# Patient Record
Sex: FEMALE | Employment: UNEMPLOYED | URBAN - METROPOLITAN AREA
[De-identification: names, ages, dates, MRNs, and addresses within clinical notes are randomized per-mention and may not be internally consistent; named-entity substitution may affect disease eponyms.]

---

## 2020-11-18 LAB
AVERAGE GLUCOSE: 88
HBA1C MFR BLD: 4.7 %

## 2022-02-16 LAB
C. TRACHOMATIS, EXTERNAL RESULT: NEGATIVE
GBS, EXTERNAL RESULT: NORMAL
N. GONORRHOEAE, EXTERNAL RESULT: NEGATIVE

## 2022-02-23 LAB
ABO, EXTERNAL RESULT: NORMAL
HEP B, EXTERNAL RESULT: NON REACTIVE
HEPATITIS C ANTIBODY, EXTERNAL RESULT: NON REACTIVE
HIV, EXTERNAL RESULT: NON REACTIVE
RH FACTOR, EXTERNAL RESULT: POSITIVE
RPR, EXTERNAL RESULT: NON REACTIVE
RUBELLA TITER, EXTERNAL RESULT: NORMAL

## 2022-06-28 ENCOUNTER — NURSE ONLY (OUTPATIENT)
Dept: OBGYN CLINIC | Age: 47
End: 2022-06-28

## 2022-06-28 VITALS — WEIGHT: 134 LBS | HEIGHT: 66 IN | BODY MASS INDEX: 21.53 KG/M2

## 2022-06-28 DIAGNOSIS — O09.43 GRAND MULTIPARITY WITH CURRENT PREGNANCY IN THIRD TRIMESTER: Primary | ICD-10-CM

## 2022-06-28 NOTE — PROGRESS NOTES
NOB consult with pt. Labs today: none. Offered pt the option of CF, SMA, and Fragile X carrier testing. Pt declines testing. Pt had negative x4 NIPT. Instructed pt on exercise/nutrition in pregnancy. Reviewed [facility] preg book. Advised pt on using [facility] for hospital needs and [facility] L&D for pregnancy related emergencies. Pt states understanding. NOB forms signed, scanned, and given to pt. Medical Hx: grandmultip, h/o GBS, h/o precip labor (3 hours)    Surgical Hx: 2006 R breast lumpectomy, 1985-appy    Last Pap: 2021-wnl per pt    Pt OB c/o: none. Pt requests to do Hgb A1C in place of glucose as this is what she has done for last several pregnancies. Will d/w  tomorrow. Also requests enema on admission to hospital d/t h/o PP constipation. D/t h/o short labors (last several deliveries were 3-4 hours) pt requests IOL at 39 weeks. Will d/w  tomorrow.

## 2022-06-29 ENCOUNTER — ROUTINE PRENATAL (OUTPATIENT)
Dept: OBGYN CLINIC | Age: 47
End: 2022-06-29
Payer: MEDICAID

## 2022-06-29 VITALS — SYSTOLIC BLOOD PRESSURE: 116 MMHG | WEIGHT: 135 LBS | DIASTOLIC BLOOD PRESSURE: 72 MMHG | BODY MASS INDEX: 22.12 KG/M2

## 2022-06-29 DIAGNOSIS — O09.523 HIGH RISK PREGNANCY, MULTIGRAVIDA OF ADVANCED MATERNAL AGE IN THIRD TRIMESTER: ICD-10-CM

## 2022-06-29 DIAGNOSIS — Z64.1 GRAND MULTIPARA: ICD-10-CM

## 2022-06-29 PROCEDURE — 99204 OFFICE O/P NEW MOD 45 MIN: CPT | Performed by: OBSTETRICS & GYNECOLOGY

## 2022-06-29 NOTE — PROGRESS NOTES
Aggie Estes  presents for Abdiel Quach 9038. [id number]. 30w0d. PL and MFM notes reviewed as applicable.   Taking Prenatal Vitamins: Yes  She is noticing:  no unusual complaints    Records reviewed   High risk pregnancy, multigravida of advanced maternal age in third trimester   history and recommendations reviewed    [address] multipara   noted

## 2022-06-29 NOTE — PATIENT INSTRUCTIONS
Learning About Pregnancy  Your Care Instructions     Your health in the early weeks of your pregnancy is particularly important for your baby's health. Take good care of yourself. Anything you do that harms your body can also harm your baby. Make sure to go to all of your doctor appointments. Regular checkups will help keep you and your baby healthy. How can you care for yourself at home? Diet    · Eat a balanced diet. Make sure your diet includes plenty of beans, peas, and leafy green vegetables. · Do not skip meals or go for many hours without eating. If you are nauseated, try to eat a small, healthy snack every 2 to 3 hours. · Do not eat fish that has a high level of mercury, such as shark, swordfish, or mackerel. Do not eat more than one can of tuna each week. · Drink plenty of fluids. If you have kidney, heart, or liver disease and have to limit fluids, talk with your doctor before you increase the amount of fluids you drink. · Cut down on caffeine, such as coffee, tea, and cola. · Do not drink alcohol, such as beer, wine, or hard liquor. · Take a multivitamin that contains at least 400 micrograms (mcg) of folic acid to help prevent birth defects. Fortified cereal and whole wheat bread are good additional sources of folic acid. · Increase the calcium in your diet. Try to drink a quart of skim milk each day. You may also take calcium supplements and choose foods such as cheese and yogurt. Lifestyle    · Make sure you go to your follow-up appointments. · Get plenty of rest. You may be unusually tired while you are pregnant. · Get at least 30 minutes of exercise on most days of the week. Walking is a good choice. If you have not exercised in the past, start out slowly. Take several short walks each day. · Do not smoke. If you need help quitting, talk to your doctor about stop-smoking programs. These can increase your chances of quitting for good.      · Do not touch cat feces or litter boxes. Also, wash your hands after you handle raw meat, and fully cook all meat before you eat it. Wear gloves when you work in the yard or garden, and wash your hands well when you are done. Cat feces, raw or undercooked meat, and contaminated dirt can cause an infection that may harm your baby or lead to a miscarriage. · Do not use saunas or hot tubs. Raising your body temperature may harm your baby. · Avoid chemical fumes, paint fumes, or poisons. · Do not use illegal drugs, marijuana, or alcohol. Medicines    · Review all of your medicines with your doctor. Some of your routine medicines may need to be changed to protect your baby. · Use acetaminophen (Tylenol) to relieve minor problems, such as a mild headache or backache or a mild fever with cold symptoms. Do not use nonsteroidal anti-inflammatory drugs (NSAIDs), such as ibuprofen (Advil, Motrin) or naproxen (Aleve), unless your doctor says it is okay. · Do not take two or more pain medicines at the same time unless the doctor told you to. Many pain medicines have acetaminophen, (tylenolO which is safe in pregnancy. However, Too much acetaminophen (Tylenol) can be harmful. · Take your medicines exactly as prescribed. Call your doctor if you think you are having a problem with your medicine. To manage morning sickness    · If you feel sick when you first wake up, try eating a small snack (such as crackers) before you get out of bed. Allow some time to digest the snack, and then get out of bed slowly. · Do not skip meals or go for long periods without eating. An empty stomach can make nausea worse. · Eat small, frequent meals instead of three large meals each day. · Drink plenty of fluids. · Eat foods that are high in protein but low in fat and carbohydrates (sugars). · If you are taking iron supplements, ask your doctor if they are necessary. Iron can make nausea worse.      · Avoid any smells,  that make you feel sick. · Get lots of rest. Morning sickness may be worse when you are tired. Follow-up care is a key part of your treatment and safety. Be sure to make and go to all appointments, and call us if you are having problems. It's also a good idea to know your test results and keep a list of the medicines you take. Nutrition During Pregnancy: Care Instructions  Overview     Healthy eating when you are pregnant is important for you and your baby. It can help you feel well and have a successful pregnancy and delivery. During pregnancy your nutrition needs increase. Even if you have excellent eating habits, your doctor may recommend a multivitamin to make sure you get enough iron and folic acid. You may wonder how much weight you should gain. In general, if you were at a healthy weight before you became pregnant, then you should gain between 25 and 35 pounds. If you were overweight before pregnancy, then you'll likely be advised to gain 15 to 25 pounds. If you were underweight before pregnancy, then you'll probably be advised to gain 28 to 40 pounds. Your doctor will work with you to set a weight goal that is right for you. Gaining a healthy amount of weight helps you have a healthy baby. Follow-up care is a key part of your treatment and safety. Be sure to make and go to all appointments, and call your doctor if you are having problems. It's also a good idea to know your test results and keep a list of the medicines you take. How can you care for yourself at home? · Eat plenty of fruits and vegetables. Include a variety of orange, yellow, and leafy dark-green vegetables every day. · Choose whole-grain bread, cereal, and pasta. Good choices include whole wheat bread, whole wheat pasta, brown rice, and oatmeal.  · Get 4 or more servings of milk and milk products each day. Good choices include nonfat or low-fat milk, yogurt, and cheese.  If you cannot eat milk products, you can get calcium from calcium-fortified products such as orange juice, soy milk, and tofu. Other non-milk sources of calcium include leafy green vegetables, such as broccoli, kale, mustard greens, turnip greens, bok laura, and brussels sprouts. · If you eat meat, pick lower-fat types. Good choices include lean cuts of meat and chicken or turkey without the skin. · Do not eat shark, swordfish, anna mackerel, or tilefish. They have high levels of mercury, which is dangerous to your baby. You can eat up to 12 ounces a week of fish or shellfish that have low mercury levels. Good choices include shrimp, wild salmon, pollock, and catfish. Limit some other types of fish, such as white (albacore) tuna, to 4 oz (0.1 kg) a week. · Heat lunch meats (such as turkey, ham, or bologna) to 165°F before you eat them. This reduces your risk of getting sick from a kind of bacteria that can be found in lunch meats. · Do not eat unpasteurized soft cheeses, such as brie, feta, fresh mozzarella, and blue cheese. They have a bacteria that could harm your baby. · Limit caffeine. If you drink coffee or tea, have no more than 1 cup a day. Caffeine is also found in april. · Do not drink any alcohol. No amount of alcohol has been found to be safe during pregnancy. · Do not diet or try to lose weight. For example, do not follow a low-carbohydrate diet. If you are overweight at the start of your pregnancy, your doctor will work with you to manage your weight gain. · Tell your doctor about all vitamins and supplements you take. When should you call for help? Watch closely for changes in your health, and be sure to contact your doctor if you have any problems. Where can you learn more? Where can you learn more? Go to http://www.gray.com/  Enter G118 in the search box to learn more about \"Learning About Pregnancy. \"  Go to http://www.gray.com/  Enter Y785 in the search box to learn more about \"Nutrition During Pregnancy: Care Instructions. \"  Current as of: December 17, 2020               Content Version: 13.0  © 6555-3777 [employer]wise, [employer]orporated. Care instructions adapted under license by [employer] (which disclaims liability or warranty for this information). If you have questions about a medical condition or this instruction, always ask your healthcare professional. [address] any warranty or liability for your use of this information.

## 2022-07-12 ENCOUNTER — CLINICAL DOCUMENTATION (OUTPATIENT)
Dept: OBGYN CLINIC | Age: 47
End: 2022-07-12

## 2022-07-12 ENCOUNTER — ROUTINE PRENATAL (OUTPATIENT)
Dept: OBGYN CLINIC | Age: 47
End: 2022-07-12
Payer: MEDICAID

## 2022-07-12 VITALS — BODY MASS INDEX: 22.12 KG/M2 | SYSTOLIC BLOOD PRESSURE: 102 MMHG | WEIGHT: 135 LBS | DIASTOLIC BLOOD PRESSURE: 60 MMHG

## 2022-07-12 DIAGNOSIS — R82.71 GBS BACTERIURIA: ICD-10-CM

## 2022-07-12 DIAGNOSIS — O09.523 AMA (ADVANCED MATERNAL AGE) MULTIGRAVIDA 35+, THIRD TRIMESTER: Primary | ICD-10-CM

## 2022-07-12 DIAGNOSIS — O09.523 HIGH RISK PREGNANCY, MULTIGRAVIDA OF ADVANCED MATERNAL AGE IN THIRD TRIMESTER: ICD-10-CM

## 2022-07-12 DIAGNOSIS — Z64.1 GRAND MULTIPARA: ICD-10-CM

## 2022-07-12 DIAGNOSIS — Z13.0 SCREENING, ANEMIA, DEFICIENCY, IRON: ICD-10-CM

## 2022-07-12 PROBLEM — O09.30 HISTORY OF INADEQUATE PRENATAL CARE: Status: ACTIVE | Noted: 2022-07-12

## 2022-07-12 PROBLEM — Z86.79 H/O VARICOSE VEINS: Status: ACTIVE | Noted: 2022-07-12

## 2022-07-12 PROCEDURE — 99417 PROLNG OP E/M EACH 15 MIN: CPT | Performed by: OBSTETRICS & GYNECOLOGY

## 2022-07-12 PROCEDURE — 99215 OFFICE O/P EST HI 40 MIN: CPT | Performed by: OBSTETRICS & GYNECOLOGY

## 2022-07-12 NOTE — PROGRESS NOTES
Several attempts have been made to contact previous OB for JULIET scan report. Only records they are sending are what we already have. Chris Ac has also called the office at [phone] and LM. Will attempt to contact again tomorrow.

## 2022-07-12 NOTE — PROGRESS NOTES
MARICRUZ [id number] 31w6d by d=12wk US  Denies LOF, VB, Ctxs. Good FM. Late Transfer pt from [facility] [address] --all records reviewed by me today   Rotating     \"Pt requests to do Hgb A1C in place of glucose as this is what she has done for last several pregnancies\" --reports inability to tolerate glucola    hgb A1C 5.0 (7/5/22)   No recent CBC -- will get today __   Tdap counseling -- DECLINES     Rh  Pos       AMA (47yo), Grandmultip:   NIPT (mat 21) low risk Female  Not taking ASA      Had not been seen since 20wks prior to seeing Dr Kelsey Olivares last encounter at [address] today __     In [address] [facility] it appears she was seen by MFM at [facility] on 4/6/22 before transfer but unfortunately I cannot see any of the note, nor the details of her [address]. We will contact them to get release of information  ASAP ___   Pt verbally reports that her US was wnl      Pt apparently unaware that she was seen by MFM for that US as she DECLINES to see MFM here b/c \"Im so low risk\" and \"I would know if there was something wrong\"               S<<D on  today     Largest baby 3640g (8#0.4oz)  Growth US w/ repeat JULIET US at [address] in 1wk ___     Will get serial growths  Weekly bpps around 36wks unless indicated earlier   Recommend IOL at 39wks           GBS bacteriuria:  See media tab from OB prior to transfer to us (+at preg intake)  Pt states was not aware   Needs treatment in labor --no need to repeat gbs swab at 36wks  Repeat urine cx today ___       Pregravid BMI 19             Patient counseled face to face for more than 50% of the total time spent with the patient. On this date I have spent 79 minutes extensively reviewing previous notes, test results, calling prior OBGYN office personally for more info, discussing pt w/ MFM in our system, and face to face with the patient discussing the diagnosis and importance of compliance with the treatment plan as well as documenting.

## 2022-07-13 ENCOUNTER — CLINICAL DOCUMENTATION (OUTPATIENT)
Dept: OBGYN CLINIC | Age: 47
End: 2022-07-13

## 2022-07-13 LAB
ERYTHROCYTE [DISTWIDTH] IN BLOOD BY AUTOMATED COUNT: 13.4 % (ref 11.9–14.6)
HCT VFR BLD AUTO: 35.1 % (ref 35.8–46.3)
HGB BLD-MCNC: 11.4 G/DL (ref 11.7–15.4)
MCH RBC QN AUTO: 27.1 PG (ref 26.1–32.9)
MCHC RBC AUTO-ENTMCNC: 32.5 G/DL (ref 31.4–35)
MCV RBC AUTO: 83.6 FL (ref 79.6–97.8)
NRBC # BLD: 0 K/UL (ref 0–0.2)
PLATELET # BLD AUTO: 200 K/UL (ref 150–450)
PMV BLD AUTO: 10.8 FL (ref 9.4–12.3)
RBC # BLD AUTO: 4.2 M/UL (ref 4.05–5.2)
WBC # BLD AUTO: 10.7 K/UL (ref 4.3–11.1)

## 2022-07-13 NOTE — PROGRESS NOTES
Call to [facility] office [phone]. S/W patrice who will contact MR and have JULIET scan faxed to us.

## 2022-07-15 LAB
BACTERIA SPEC CULT: ABNORMAL
BACTERIA SPEC CULT: ABNORMAL
SERVICE CMNT-IMP: ABNORMAL

## 2022-07-24 NOTE — ASSESSMENT & PLAN NOTE
S<<D on FH today     Largest baby 3640g (8#0.4oz)  Growth US w/ repeat JULIET US at [address] in 1wk ___pt either cx'd or NS'd this appt with me. When RS'd, growth scan wasn't added.       Will get serial growths  Weekly bpps around 36wks unless indicated earlier   Recommend IOL at 39wks

## 2022-07-25 ENCOUNTER — ROUTINE PRENATAL (OUTPATIENT)
Dept: OBGYN CLINIC | Age: 47
End: 2022-07-25
Payer: MEDICAID

## 2022-07-25 VITALS
HEIGHT: 66 IN | BODY MASS INDEX: 22.18 KG/M2 | WEIGHT: 138 LBS | DIASTOLIC BLOOD PRESSURE: 66 MMHG | SYSTOLIC BLOOD PRESSURE: 100 MMHG

## 2022-07-25 DIAGNOSIS — O09.30 HISTORY OF INADEQUATE PRENATAL CARE: ICD-10-CM

## 2022-07-25 DIAGNOSIS — Z91.199 NONCOMPLIANCE: ICD-10-CM

## 2022-07-25 DIAGNOSIS — Z64.1 GRAND MULTIPARA: ICD-10-CM

## 2022-07-25 DIAGNOSIS — O09.523 HIGH RISK PREGNANCY, MULTIGRAVIDA OF ADVANCED MATERNAL AGE IN THIRD TRIMESTER: Primary | ICD-10-CM

## 2022-07-25 DIAGNOSIS — Z3A.33 33 WEEKS GESTATION OF PREGNANCY: ICD-10-CM

## 2022-07-25 PROCEDURE — 99213 OFFICE O/P EST LOW 20 MIN: CPT | Performed by: OBSTETRICS & GYNECOLOGY

## 2022-07-25 NOTE — PROGRESS NOTES
Jovani Menard  presents for Abdiel Quach 9038. [id number]. 33w5d. PL and MFM notes reviewed as applicable. Taking Prenatal Vitamins: Yes  She is noticing:  no unusual complaints  As some impt info is always in the [facility] flowsheet, please also refer to that. History of inadequate prenatal care  UDS wasn't done on 7-12. Sent today. High risk pregnancy, multigravida of advanced maternal age in third trimester  S<<D on FH today     Largest baby 65g (8#0.4oz)  Growth US w/ repeat JULIET US at [address] in 1wk ___pt either cx'd or NS'd this appt with me. When RS'd, growth scan wasn't added. Will get serial growths  Weekly bpps around 36wks unless indicated earlier   Recommend IOL at 39wks     Pt again today declines growth scan and also declines any future fetal testing. I explained to pt that she is 40, and is at increased risk for fetal growth issues, placental dysfxn, stillbirth. She says she feels like everything is going fine and she is going to skip all that.

## 2022-07-26 LAB
AMPHET UR QL SCN: NEGATIVE
BARBITURATES UR QL SCN: NEGATIVE
BENZODIAZ UR QL: NEGATIVE
CANNABINOIDS UR QL SCN: NEGATIVE
COCAINE UR QL SCN: NEGATIVE
METHADONE UR QL: NEGATIVE
OPIATES UR QL: NEGATIVE
PCP UR QL: NEGATIVE

## 2022-08-11 ENCOUNTER — ROUTINE PRENATAL (OUTPATIENT)
Dept: OBGYN CLINIC | Age: 47
End: 2022-08-11
Payer: MEDICAID

## 2022-08-11 VITALS — BODY MASS INDEX: 22.29 KG/M2 | SYSTOLIC BLOOD PRESSURE: 102 MMHG | DIASTOLIC BLOOD PRESSURE: 70 MMHG | WEIGHT: 136 LBS

## 2022-08-11 DIAGNOSIS — Z64.1 GRAND MULTIPARA: ICD-10-CM

## 2022-08-11 DIAGNOSIS — Z91.199 NONCOMPLIANCE: ICD-10-CM

## 2022-08-11 DIAGNOSIS — O09.523 HIGH RISK PREGNANCY, MULTIGRAVIDA OF ADVANCED MATERNAL AGE IN THIRD TRIMESTER: Primary | ICD-10-CM

## 2022-08-11 PROCEDURE — 99213 OFFICE O/P EST LOW 20 MIN: CPT | Performed by: OBSTETRICS & GYNECOLOGY

## 2022-08-16 ENCOUNTER — ROUTINE PRENATAL (OUTPATIENT)
Dept: OBGYN CLINIC | Age: 47
End: 2022-08-16
Payer: MEDICAID

## 2022-08-16 ENCOUNTER — CLINICAL DOCUMENTATION (OUTPATIENT)
Dept: OBGYN CLINIC | Age: 47
End: 2022-08-16

## 2022-08-16 VITALS — SYSTOLIC BLOOD PRESSURE: 104 MMHG | BODY MASS INDEX: 22.62 KG/M2 | WEIGHT: 138 LBS | DIASTOLIC BLOOD PRESSURE: 64 MMHG

## 2022-08-16 DIAGNOSIS — O09.523 HIGH RISK PREGNANCY, MULTIGRAVIDA OF ADVANCED MATERNAL AGE IN THIRD TRIMESTER: Primary | ICD-10-CM

## 2022-08-16 DIAGNOSIS — Z64.1 GRAND MULTIPARA: ICD-10-CM

## 2022-08-16 DIAGNOSIS — Z91.199 NONCOMPLIANCE: ICD-10-CM

## 2022-08-16 PROCEDURE — 99213 OFFICE O/P EST LOW 20 MIN: CPT | Performed by: OBSTETRICS & GYNECOLOGY

## 2022-08-16 NOTE — ASSESSMENT & PLAN NOTE
Kick counts and labor precautions reviewed   Declines/reufses recommended  testing or US. Has been extensively counseled previously and again today by myself.    3 cm last week with CBB  IOL requested for  at 39 weeks  We reviewed increased risk of PPH due to grand mul parity

## 2022-08-16 NOTE — PROGRESS NOTES
S/LILIAN Gonzalez at [facility] L&D to schedule pt's IOL with  on 8/31/22. Tynker message sent to pt.

## 2022-08-26 ENCOUNTER — TELEPHONE (OUTPATIENT)
Dept: OBGYN CLINIC | Age: 47
End: 2022-08-26

## 2022-08-26 NOTE — TELEPHONE ENCOUNTER
Message received from pt requesting an appt for Monday. Pt is scheduled for IOL 8/31/22. Last seen 8/16/22. Call to pt to schedule. No answer. Scheduled for 8/29/22 9:300 with . LM with appt info.

## 2022-08-28 ENCOUNTER — HOSPITAL ENCOUNTER (OUTPATIENT)
Age: 47
Discharge: HOME | End: 2022-08-28
Attending: OBSTETRICS & GYNECOLOGY | Admitting: OBSTETRICS & GYNECOLOGY
Payer: MEDICAID

## 2022-08-28 VITALS
TEMPERATURE: 98.1 F | DIASTOLIC BLOOD PRESSURE: 60 MMHG | RESPIRATION RATE: 16 BRPM | OXYGEN SATURATION: 98 % | WEIGHT: 140 LBS | BODY MASS INDEX: 22.5 KG/M2 | HEIGHT: 66 IN | HEART RATE: 74 BPM | SYSTOLIC BLOOD PRESSURE: 100 MMHG

## 2022-08-28 PROBLEM — O47.1 FALSE LABOR AT OR AFTER 37 COMPLETED WEEKS OF GESTATION: Status: ACTIVE | Noted: 2022-08-28

## 2022-08-28 PROCEDURE — 99282 EMERGENCY DEPT VISIT SF MDM: CPT

## 2022-08-28 NOTE — PROGRESS NOTES
Discharge paperwork gone over w/ pt. Pt verbalized understanding and has no questions at this time. NST reactive. TOCO and EFM removed from abd. Pt ambulated off of floor w/o difficulty.

## 2022-08-28 NOTE — PROGRESS NOTES
Pt reports to [facility] w/ complaints of ctx q15 mins that were painful. Pt stated ctx were a 3 out of 10 in pain. Pt stated that ctx have started to become further apart since arrival. TOCO and EFM applied to soft, non-tender abd. Pt has no complaints of any LOF, vaginal bleeding, blurry vision, or H/A. SVE performed, 3/40/-3. MD made aware.

## 2022-08-30 RX ORDER — TERBUTALINE SULFATE 1 MG/ML
0.25 INJECTION, SOLUTION SUBCUTANEOUS ONCE
Status: CANCELLED | OUTPATIENT
Start: 2022-08-30 | End: 2022-08-30

## 2022-08-30 RX ORDER — SODIUM CHLORIDE 0.9 % (FLUSH) 0.9 %
5-40 SYRINGE (ML) INJECTION EVERY 12 HOURS SCHEDULED
Status: CANCELLED | OUTPATIENT
Start: 2022-08-30

## 2022-08-30 RX ORDER — SODIUM CHLORIDE 9 MG/ML
25 INJECTION, SOLUTION INTRAVENOUS PRN
Status: CANCELLED | OUTPATIENT
Start: 2022-08-30

## 2022-08-30 RX ORDER — SODIUM CHLORIDE, SODIUM LACTATE, POTASSIUM CHLORIDE, AND CALCIUM CHLORIDE .6; .31; .03; .02 G/100ML; G/100ML; G/100ML; G/100ML
1000 INJECTION, SOLUTION INTRAVENOUS PRN
Status: CANCELLED | OUTPATIENT
Start: 2022-08-30

## 2022-08-30 RX ORDER — SODIUM CHLORIDE, SODIUM LACTATE, POTASSIUM CHLORIDE, CALCIUM CHLORIDE 600; 310; 30; 20 MG/100ML; MG/100ML; MG/100ML; MG/100ML
INJECTION, SOLUTION INTRAVENOUS CONTINUOUS
Status: CANCELLED | OUTPATIENT
Start: 2022-08-30

## 2022-08-30 RX ORDER — SODIUM CHLORIDE, SODIUM LACTATE, POTASSIUM CHLORIDE, AND CALCIUM CHLORIDE .6; .31; .03; .02 G/100ML; G/100ML; G/100ML; G/100ML
500 INJECTION, SOLUTION INTRAVENOUS PRN
Status: CANCELLED | OUTPATIENT
Start: 2022-08-30

## 2022-08-30 RX ORDER — TRANEXAMIC ACID 10 MG/ML
1000 INJECTION, SOLUTION INTRAVENOUS
Status: CANCELLED | OUTPATIENT
Start: 2022-08-30 | End: 2022-08-30

## 2022-08-30 RX ORDER — MISOPROSTOL 200 UG/1
800 TABLET ORAL PRN
Status: CANCELLED | OUTPATIENT
Start: 2022-08-30

## 2022-08-30 RX ORDER — ONDANSETRON 2 MG/ML
4 INJECTION INTRAMUSCULAR; INTRAVENOUS EVERY 6 HOURS PRN
Status: CANCELLED | OUTPATIENT
Start: 2022-08-30

## 2022-08-30 RX ORDER — CARBOPROST TROMETHAMINE 250 UG/ML
250 INJECTION, SOLUTION INTRAMUSCULAR PRN
Status: CANCELLED | OUTPATIENT
Start: 2022-08-30

## 2022-08-30 RX ORDER — SODIUM CHLORIDE 0.9 % (FLUSH) 0.9 %
5-40 SYRINGE (ML) INJECTION PRN
Status: CANCELLED | OUTPATIENT
Start: 2022-08-30

## 2022-08-30 RX ORDER — METHYLERGONOVINE MALEATE 0.2 MG/ML
200 INJECTION INTRAVENOUS PRN
Status: CANCELLED | OUTPATIENT
Start: 2022-08-30

## 2022-08-31 ENCOUNTER — ANESTHESIA EVENT (OUTPATIENT)
Dept: LABOR AND DELIVERY | Age: 47
End: 2022-08-31
Payer: MEDICAID

## 2022-08-31 ENCOUNTER — HOSPITAL ENCOUNTER (INPATIENT)
Age: 47
LOS: 1 days | Discharge: HOME | End: 2022-09-01
Attending: OBSTETRICS & GYNECOLOGY | Admitting: OBSTETRICS & GYNECOLOGY
Payer: MEDICAID

## 2022-08-31 ENCOUNTER — ANESTHESIA (OUTPATIENT)
Dept: LABOR AND DELIVERY | Age: 47
End: 2022-08-31
Payer: MEDICAID

## 2022-08-31 PROBLEM — O09.523 MULTIGRAVIDA OF ADVANCED MATERNAL AGE IN THIRD TRIMESTER: Status: ACTIVE | Noted: 2022-08-31

## 2022-08-31 PROBLEM — O47.1 FALSE LABOR AT OR AFTER 37 COMPLETED WEEKS OF GESTATION: Status: RESOLVED | Noted: 2022-08-28 | Resolved: 2022-08-31

## 2022-08-31 PROBLEM — Z3A.39 39 WEEKS GESTATION OF PREGNANCY: Status: ACTIVE | Noted: 2022-08-31

## 2022-08-31 LAB
ABO + RH BLD: NORMAL
BASOPHILS # BLD: 0.1 K/UL (ref 0–0.2)
BASOPHILS NFR BLD: 1 % (ref 0–2)
BLOOD GROUP ANTIBODIES SERPL: NORMAL
DIFFERENTIAL METHOD BLD: NORMAL
EOSINOPHIL # BLD: 0.3 K/UL (ref 0–0.8)
EOSINOPHIL NFR BLD: 3 % (ref 0.5–7.8)
ERYTHROCYTE [DISTWIDTH] IN BLOOD BY AUTOMATED COUNT: 13.2 % (ref 11.9–14.6)
HCT VFR BLD AUTO: 36.2 % (ref 35.8–46.3)
HGB BLD-MCNC: 11.8 G/DL (ref 11.7–15.4)
IMM GRANULOCYTES # BLD AUTO: 0.1 K/UL (ref 0–0.5)
IMM GRANULOCYTES NFR BLD AUTO: 1 % (ref 0–5)
LYMPHOCYTES # BLD: 2.4 K/UL (ref 0.5–4.6)
LYMPHOCYTES NFR BLD: 23 % (ref 13–44)
MCH RBC QN AUTO: 26.9 PG (ref 26.1–32.9)
MCHC RBC AUTO-ENTMCNC: 32.6 G/DL (ref 31.4–35)
MCV RBC AUTO: 82.5 FL (ref 79.6–97.8)
MONOCYTES # BLD: 0.6 K/UL (ref 0.1–1.3)
MONOCYTES NFR BLD: 6 % (ref 4–12)
NEUTS SEG # BLD: 6.9 K/UL (ref 1.7–8.2)
NEUTS SEG NFR BLD: 67 % (ref 43–78)
NRBC # BLD: 0 K/UL (ref 0–0.2)
PLATELET # BLD AUTO: 200 K/UL (ref 150–450)
PMV BLD AUTO: 10.3 FL (ref 9.4–12.3)
RBC # BLD AUTO: 4.39 M/UL (ref 4.05–5.2)
SPECIMEN EXP DATE BLD: NORMAL
WBC # BLD AUTO: 10.3 K/UL (ref 4.3–11.1)

## 2022-08-31 PROCEDURE — 10907ZC DRAINAGE OF AMNIOTIC FLUID, THERAPEUTIC FROM PRODUCTS OF CONCEPTION, VIA NATURAL OR ARTIFICIAL OPENING: ICD-10-PCS | Performed by: OBSTETRICS & GYNECOLOGY

## 2022-08-31 PROCEDURE — 6370000000 HC RX 637 (ALT 250 FOR IP): Performed by: OBSTETRICS & GYNECOLOGY

## 2022-08-31 PROCEDURE — 7100000010 HC PHASE II RECOVERY - FIRST 15 MIN

## 2022-08-31 PROCEDURE — 86901 BLOOD TYPING SEROLOGIC RH(D): CPT

## 2022-08-31 PROCEDURE — 6360000002 HC RX W HCPCS: Performed by: NURSE ANESTHETIST, CERTIFIED REGISTERED

## 2022-08-31 PROCEDURE — 00HU33Z INSERTION OF INFUSION DEVICE INTO SPINAL CANAL, PERCUTANEOUS APPROACH: ICD-10-PCS | Performed by: ANESTHESIOLOGY

## 2022-08-31 PROCEDURE — 1100000000 HC RM PRIVATE

## 2022-08-31 PROCEDURE — 3E0R3BZ INTRODUCTION OF ANESTHETIC AGENT INTO SPINAL CANAL, PERCUTANEOUS APPROACH: ICD-10-PCS | Performed by: ANESTHESIOLOGY

## 2022-08-31 PROCEDURE — 7220000101 HC DELIVERY VAGINAL/SINGLE

## 2022-08-31 PROCEDURE — 2580000003 HC RX 258: Performed by: OBSTETRICS & GYNECOLOGY

## 2022-08-31 PROCEDURE — 6360000002 HC RX W HCPCS: Performed by: OBSTETRICS & GYNECOLOGY

## 2022-08-31 PROCEDURE — 59409 OBSTETRICAL CARE: CPT | Performed by: OBSTETRICS & GYNECOLOGY

## 2022-08-31 PROCEDURE — 7100000011 HC PHASE II RECOVERY - ADDTL 15 MIN

## 2022-08-31 PROCEDURE — 36415 COLL VENOUS BLD VENIPUNCTURE: CPT

## 2022-08-31 PROCEDURE — 85025 COMPLETE CBC W/AUTO DIFF WBC: CPT

## 2022-08-31 PROCEDURE — 59025 FETAL NON-STRESS TEST: CPT

## 2022-08-31 PROCEDURE — 6360000002 HC RX W HCPCS: Performed by: ANESTHESIOLOGY

## 2022-08-31 PROCEDURE — 3E033VJ INTRODUCTION OF OTHER HORMONE INTO PERIPHERAL VEIN, PERCUTANEOUS APPROACH: ICD-10-PCS | Performed by: OBSTETRICS & GYNECOLOGY

## 2022-08-31 PROCEDURE — 3700000025 EPIDURAL BLOCK: Performed by: ANESTHESIOLOGY

## 2022-08-31 PROCEDURE — 7210000100 HC LABOR FEE PER 1 HR

## 2022-08-31 RX ORDER — POLYETHYLENE GLYCOL 3350 17 G/17G
17 POWDER, FOR SOLUTION ORAL DAILY
Status: DISCONTINUED | OUTPATIENT
Start: 2022-08-31 | End: 2022-09-01 | Stop reason: HOSPADM

## 2022-08-31 RX ORDER — SODIUM CHLORIDE 0.9 % (FLUSH) 0.9 %
5-40 SYRINGE (ML) INJECTION EVERY 12 HOURS SCHEDULED
Status: DISCONTINUED | OUTPATIENT
Start: 2022-08-31 | End: 2022-08-31

## 2022-08-31 RX ORDER — OXYCODONE HYDROCHLORIDE 5 MG/1
5 TABLET ORAL EVERY 4 HOURS PRN
Status: DISCONTINUED | OUTPATIENT
Start: 2022-08-31 | End: 2022-09-01 | Stop reason: HOSPADM

## 2022-08-31 RX ORDER — SODIUM CHLORIDE 9 MG/ML
25 INJECTION, SOLUTION INTRAVENOUS PRN
Status: DISCONTINUED | OUTPATIENT
Start: 2022-08-31 | End: 2022-08-31

## 2022-08-31 RX ORDER — METHYLERGONOVINE MALEATE 0.2 MG/ML
200 INJECTION INTRAVENOUS
Status: ACTIVE | OUTPATIENT
Start: 2022-08-31 | End: 2022-08-31

## 2022-08-31 RX ORDER — TERBUTALINE SULFATE 1 MG/ML
0.25 INJECTION, SOLUTION SUBCUTANEOUS ONCE
Status: DISCONTINUED | OUTPATIENT
Start: 2022-08-31 | End: 2022-08-31

## 2022-08-31 RX ORDER — LANOLIN
CREAM (ML) TOPICAL PRN
Status: DISCONTINUED | OUTPATIENT
Start: 2022-08-31 | End: 2022-09-01 | Stop reason: HOSPADM

## 2022-08-31 RX ORDER — IBUPROFEN 600 MG/1
600 TABLET ORAL EVERY 6 HOURS
Status: DISCONTINUED | OUTPATIENT
Start: 2022-08-31 | End: 2022-09-01 | Stop reason: HOSPADM

## 2022-08-31 RX ORDER — ACETAMINOPHEN 325 MG/1
650 TABLET ORAL EVERY 6 HOURS
Status: DISCONTINUED | OUTPATIENT
Start: 2022-08-31 | End: 2022-09-01 | Stop reason: HOSPADM

## 2022-08-31 RX ORDER — CARBOPROST TROMETHAMINE 250 UG/ML
250 INJECTION, SOLUTION INTRAMUSCULAR PRN
Status: DISCONTINUED | OUTPATIENT
Start: 2022-08-31 | End: 2022-08-31

## 2022-08-31 RX ORDER — SODIUM CHLORIDE 9 MG/ML
INJECTION, SOLUTION INTRAVENOUS PRN
Status: DISCONTINUED | OUTPATIENT
Start: 2022-08-31 | End: 2022-09-01 | Stop reason: HOSPADM

## 2022-08-31 RX ORDER — SODIUM CHLORIDE 0.9 % (FLUSH) 0.9 %
5-40 SYRINGE (ML) INJECTION PRN
Status: DISCONTINUED | OUTPATIENT
Start: 2022-08-31 | End: 2022-09-01 | Stop reason: HOSPADM

## 2022-08-31 RX ORDER — ACETAMINOPHEN 325 MG/1
650 TABLET ORAL EVERY 4 HOURS PRN
Status: DISCONTINUED | OUTPATIENT
Start: 2022-08-31 | End: 2022-08-31 | Stop reason: SDUPTHER

## 2022-08-31 RX ORDER — FAMOTIDINE 20 MG/1
20 TABLET, FILM COATED ORAL 2 TIMES DAILY PRN
Status: DISCONTINUED | OUTPATIENT
Start: 2022-08-31 | End: 2022-09-01 | Stop reason: HOSPADM

## 2022-08-31 RX ORDER — SODIUM CHLORIDE, SODIUM LACTATE, POTASSIUM CHLORIDE, AND CALCIUM CHLORIDE .6; .31; .03; .02 G/100ML; G/100ML; G/100ML; G/100ML
1000 INJECTION, SOLUTION INTRAVENOUS PRN
Status: DISCONTINUED | OUTPATIENT
Start: 2022-08-31 | End: 2022-08-31

## 2022-08-31 RX ORDER — SODIUM CHLORIDE, SODIUM LACTATE, POTASSIUM CHLORIDE, AND CALCIUM CHLORIDE .6; .31; .03; .02 G/100ML; G/100ML; G/100ML; G/100ML
500 INJECTION, SOLUTION INTRAVENOUS PRN
Status: DISCONTINUED | OUTPATIENT
Start: 2022-08-31 | End: 2022-08-31

## 2022-08-31 RX ORDER — SODIUM CHLORIDE 0.9 % (FLUSH) 0.9 %
5-40 SYRINGE (ML) INJECTION EVERY 12 HOURS SCHEDULED
Status: DISCONTINUED | OUTPATIENT
Start: 2022-08-31 | End: 2022-09-01 | Stop reason: HOSPADM

## 2022-08-31 RX ORDER — SIMETHICONE 80 MG
80 TABLET,CHEWABLE ORAL EVERY 6 HOURS PRN
Status: DISCONTINUED | OUTPATIENT
Start: 2022-08-31 | End: 2022-09-01 | Stop reason: HOSPADM

## 2022-08-31 RX ORDER — SODIUM CHLORIDE, SODIUM LACTATE, POTASSIUM CHLORIDE, CALCIUM CHLORIDE 600; 310; 30; 20 MG/100ML; MG/100ML; MG/100ML; MG/100ML
INJECTION, SOLUTION INTRAVENOUS CONTINUOUS
Status: DISCONTINUED | OUTPATIENT
Start: 2022-08-31 | End: 2022-08-31

## 2022-08-31 RX ORDER — ROPIVACAINE HYDROCHLORIDE 5 MG/ML
INJECTION, SOLUTION EPIDURAL; INFILTRATION; PERINEURAL PRN
Status: DISCONTINUED | OUTPATIENT
Start: 2022-08-31 | End: 2022-08-31 | Stop reason: SDUPTHER

## 2022-08-31 RX ORDER — ONDANSETRON 2 MG/ML
4 INJECTION INTRAMUSCULAR; INTRAVENOUS EVERY 6 HOURS PRN
Status: DISCONTINUED | OUTPATIENT
Start: 2022-08-31 | End: 2022-08-31

## 2022-08-31 RX ORDER — ROPIVACAINE HYDROCHLORIDE 2 MG/ML
INJECTION, SOLUTION EPIDURAL; INFILTRATION; PERINEURAL CONTINUOUS PRN
Status: DISCONTINUED | OUTPATIENT
Start: 2022-08-31 | End: 2022-08-31 | Stop reason: SDUPTHER

## 2022-08-31 RX ORDER — ONDANSETRON 8 MG/1
8 TABLET, ORALLY DISINTEGRATING ORAL EVERY 8 HOURS PRN
Status: DISCONTINUED | OUTPATIENT
Start: 2022-08-31 | End: 2022-09-01 | Stop reason: HOSPADM

## 2022-08-31 RX ORDER — SODIUM CHLORIDE, SODIUM LACTATE, POTASSIUM CHLORIDE, CALCIUM CHLORIDE 600; 310; 30; 20 MG/100ML; MG/100ML; MG/100ML; MG/100ML
INJECTION, SOLUTION INTRAVENOUS CONTINUOUS
Status: DISCONTINUED | OUTPATIENT
Start: 2022-08-31 | End: 2022-09-01 | Stop reason: HOSPADM

## 2022-08-31 RX ORDER — SODIUM CHLORIDE 0.9 % (FLUSH) 0.9 %
5-40 SYRINGE (ML) INJECTION PRN
Status: DISCONTINUED | OUTPATIENT
Start: 2022-08-31 | End: 2022-08-31

## 2022-08-31 RX ORDER — MISOPROSTOL 200 UG/1
200 TABLET ORAL
Status: ACTIVE | OUTPATIENT
Start: 2022-08-31 | End: 2022-08-31

## 2022-08-31 RX ADMIN — Medication: at 18:42

## 2022-08-31 RX ADMIN — ROPIVACAINE HYDROCHLORIDE 10 ML: 5 INJECTION, SOLUTION EPIDURAL; INFILTRATION; PERINEURAL at 12:39

## 2022-08-31 RX ADMIN — Medication 909 ML: at 14:24

## 2022-08-31 RX ADMIN — SODIUM CHLORIDE 2.5 MILLION UNITS: 9 INJECTION, SOLUTION INTRAVENOUS at 11:00

## 2022-08-31 RX ADMIN — ROPIVACAINE HYDROCHLORIDE 10 ML/HR: 2 INJECTION, SOLUTION EPIDURAL; INFILTRATION at 12:44

## 2022-08-31 RX ADMIN — SODIUM CHLORIDE 5 MILLION UNITS: 900 INJECTION INTRAVENOUS at 07:30

## 2022-08-31 RX ADMIN — Medication 87.3 MILLI-UNITS/MIN: at 14:46

## 2022-08-31 RX ADMIN — Medication 2 MILLI-UNITS/MIN: at 08:15

## 2022-08-31 NOTE — PROGRESS NOTES
Pt up and ambulated to bathroom. Pt able to void 600ml. Afua care performed by pt. Pt tolerated ambulation well. Pt in bed with call light in reach.

## 2022-08-31 NOTE — L&D DELIVERY NOTE
Mother's Information      Labor Events     Labor?: No  Cervical Ripening:   Now               Terese Woods [[id number]]      Labor Events     Labor?: No   Steroids?: None  Cervical Ripening Date/Time:     Antibiotics Received during Labor?: Yes  Rupture Date/Time: 22 13:09:00   Fluid Color: Clear  Fluid Odor: None  Induction: AROM, Oxytocin  Labor Complications: None       Anesthesia    Method: Epidural       Start Pushing      Labor onset date/time: 22 07:00:00 Now     Dilation complete date/time:   Now     Start pushing date/time:    Decision date/time (emergent ):           Labor Length    3rd stage: 0h 02m       Delivery ()      Delivery Date/Time:  22 14:21:00   Delivery Type: Vaginal, Spontaneous    Details:            Bethel Presentation    Presentation: Vertex  _: Occiput  _: Posterior       Shoulder Dystocia    Shoulder Dystocia Present?: No  Add Second Maneuver  Add Third Maneuver  Add Fourth Maneuver  Add Fifth Maneuver  Add Sixth Maneuver  Add Seventh Maneuver  Add Eighth Maneuver  Add Ninth Maneuver       Assisted Delivery Details    Forceps Attempted?: No  Vacuum Extractor Attempted?: No       Document Additional Attempt         Document Additional Attempt                 Cord    Vessels: 3 Vessels  Complications: None  Delayed Cord Clamping?: Yes  Cord Clamped Date/Time: 2022 14:22:00  Cord Blood Disposition: Lab  Gases Sent?: Yes       Placenta    Date/Time: 2022 14:23:45  Removal: Spontaneous  Appearance: Intact  Disposition: Discarded       Lacerations    Episiotomy: None  Perineal Lacerations: None  Other Lacerations: no non-perineal laceration       Vaginal Counts    Initial Count Personnel: JC JONES RN  Initial Count Verified By: DR Wally Agrawal    Sponges Needles Instruments   Initial Counts Correct Correct Correct   Final Counts Correct Correct Correct   Final Count Personnel: DR Wally Agrawal  Final Count Verified By: JC JONES RN  Accurate Final Count?: Yes  If the count is incorrect due to Intentionally Retained Foreign Object (IRFO) add the IRFO LDA in Lines/Drains. Add LDA: Link to [facility]       Blood Loss  Mother: Charlee Sepulveda #[id number]     Start of Mother's Information      Delivery Blood Loss  22 0700 - 22 1458      None                 End of Mother's Information  Mother: Charlee Matter #[id number]                Delivery Providers    Delivering clinician: Delilah Miller DO     Provider Role    Delilah Miller DO Obstetrician    Zina Rey RN Primary Nurse    Sammy Clifton RN Primary  Nurse              Reddick Assessment    Living Status: Living     Apgar Scoring Key:    0 1 2    Skin Color: Blue or pale Acrocyanotic Completely pink    Heart Rate: Absent <100 bpm >100 bpm    Reflex Irritability: No response Grimace Cry or active withdrawal    Muscle Tone: Limp Some flexion Active motion    Respiratory Effort: Absent Weak cry; hypoventilation Good, crying                      Skin Color:   Heart Rate:   Reflex Irritability:   Muscle Tone:   Respiratory Effort: Total:            1 Minute:    0    2    2    2    2    8        Apgar 1 total from OB History    5 Minute:    1    2    2    2    2    9        Apgar 5 total from OB History    10 Minute:              15 Minute:              20 Minute:                        Apgars Assigned By: ZAHRA BAKER              Resuscitation    Method: Bulb Suction, Stimulation              Measurements               Title      Skin to Skin Initiation Date/Time:       Skin to Skin End Date/Time:                     of a Viable I on 22  Apgars 8, 9  C/C/+2-->pushed to deliver head  onto intact perineum. Body followed easily thereafter. Delayed cord clamping, baby to mom. Cord clamped/cut. Cord gases were drawn. Placenta delivered S/I/3VC. No lac noted. FF w/ pit and massage. QBL per RN. Mom/baby stable.        Ros Anne DO

## 2022-08-31 NOTE — ANESTHESIA PROCEDURE NOTES
Epidural Block    Patient location during procedure: OB  Start time: 8/31/2022 12:37 PM  End time: 8/31/2022 12:39 PM  Reason for block: labor epidural  Staffing  Performed: anesthesiologist   Anesthesiologist: Bhavana Phoenix MD  Epidural  Patient position: sitting  Prep: ChloraPrep  Patient monitoring: continuous pulse ox and frequent blood pressure checks  Approach: midline  Location: L3-4  Injection technique: ESTEFANIA saline  Provider prep: mask and sterile gloves  Needle  Needle type: Tuohy   Needle gauge: 17 G  Needle length: 3.5 in  Needle insertion depth: 4.5 cm  Catheter type: end hole  Catheter size: 19 G  Catheter at skin depth: 9 cm  Test dose: negativeCatheter Secured: tegaderm  Assessment  Hemodynamics: stable  Attempts: 1  Outcomes: uncomplicated  Preanesthetic Checklist  Completed: patient identified, IV checked, site marked, risks and benefits discussed, surgical/procedural consents, equipment checked, timeout performed, anesthesia consent given, oxygen available and monitors applied/VS acknowledged

## 2022-08-31 NOTE — PROGRESS NOTES
SBAR OUT Report: Mother    Verbal report given to Ilene Marrufo RN on this patient, who is now being transferred to [address] [place] [address] for routine progression of patient care. The patient is not wearing a green \"Anesthesia-Duramorph\" band. Report consisted of patient's Situation, Background, Assessment and Recommendations (SBAR). Maxwell ID bands were compared with the identification form, and verified with the patient and receiving nurse. Information from the Nurse Handoff Report and the Josie Report was reviewed with the receiving nurse; opportunity for questions and clarification provided.

## 2022-08-31 NOTE — H&P
Labor Induction Admission Note    Johanna Shadow  [id number]  Estimated Date of Delivery: 22     OB History          15    Para   10    Term   10            AB   3    Living   1         SAB   1    IAB        Ectopic        Molar        Multiple        Live Births   1                Patient admitted with pregnancy at 39w0d for induction of labor due to [place] (age 40). Patient refused  testing or weekly monitoring of fetus. Pregnancy also complicated by GBS UTI.       Current Facility-Administered Medications   Medication Dose Route Frequency Provider Last Rate Last Admin    lactated ringers infusion   IntraVENous Continuous Ros L Dishler, DO        lactated ringers bolus  500 mL IntraVENous PRN Ros L Dishler, DO        Or    lactated ringers bolus  1,000 mL IntraVENous PRN Ros L Dishler, DO        sodium chloride flush 0.9 % injection 5-40 mL  5-40 mL IntraVENous 2 times per day Ros L Dishler, DO        sodium chloride flush 0.9 % injection 5-40 mL  5-40 mL IntraVENous PRN Ros L Dishler, DO        0.9 % sodium chloride infusion  25 mL IntraVENous PRN Ros L Dishler, DO        oxytocin (PITOCIN) 30 units in 500 mL infusion  1-20 jorge-units/min IntraVENous Continuous Ros L Dishler, DO 2 mL/hr at 22 0815 2 jorge-units/min at 22 0815    carboprost (HEMABATE) injection 250 mcg  250 mcg IntraMUSCular PRN Ros L Dishler, DO        oxytocin (PITOCIN) 30 units in 500 mL infusion  87.3 jorge-units/min IntraVENous Continuous PRN Ros L Dishler, DO        And    oxytocin (PITOCIN) 10 unit bolus from the bag  10 Units IntraVENous PRN Ros L Dishler, DO        terbutaline (BRETHINE) injection 0.25 mg  0.25 mg SubCUTAneous Once Ros L Dishler, DO        butorphanol (STADOL) injection 1 mg  1 mg IntraVENous Q3H PRN Ros L Dishler, DO        ondansetron (ZOFRAN) injection 4 mg  4 mg IntraVENous Q6H PRN Ros L Dishler, DO        penicillin G potassium 2.5

## 2022-08-31 NOTE — PROGRESS NOTES
Pt oriented to room and call light. Admission assessment complete see flowsheet. Discussed today plan of care with pt. Pt declines Tylenol and Miralax at this time. Pt declines urge to void. Questions encouraged and answered. No s/s of distress noted. Pt in bed with call light in reach.

## 2022-08-31 NOTE — ANESTHESIA PRE PROCEDURE
Department of Anesthesiology  Preprocedure Note       Name:  Cassie Vela   Age:  40 y.o.  :  1975                                          MRN:  [id number]         Date:  2022      Surgeon: * No surgeons listed *    Procedure: * No procedures listed *    Medications prior to admission:   Prior to Admission medications    Medication Sig Start Date End Date Taking?  Authorizing Provider   Prenatal Vit-Fe Fumarate-FA (PRENATAL VITAMIN PO) Take by mouth    Historical Provider, MD       Current medications:    Current Facility-Administered Medications   Medication Dose Route Frequency Provider Last Rate Last Admin    lactated ringers infusion   IntraVENous Continuous Ros L Dishler, DO        lactated ringers bolus  500 mL IntraVENous PRN Ros L Dishler, DO        Or    lactated ringers bolus  1,000 mL IntraVENous PRN Ros L Dishler, DO        sodium chloride flush 0.9 % injection 5-40 mL  5-40 mL IntraVENous 2 times per day Ros L Dishler, DO        sodium chloride flush 0.9 % injection 5-40 mL  5-40 mL IntraVENous PRN Ros L Dishler, DO        0.9 % sodium chloride infusion  25 mL IntraVENous PRN Ros L Dishler, DO        oxytocin (PITOCIN) 30 units in 500 mL infusion  1-20 jorge-units/min IntraVENous Continuous Ros L Dishler, DO 18 mL/hr at 22 1216 18 jorge-units/min at 22 1216    carboprost (HEMABATE) injection 250 mcg  250 mcg IntraMUSCular PRN Ros L Dishler, DO        oxytocin (PITOCIN) 30 units in 500 mL infusion  87.3 jorge-units/min IntraVENous Continuous PRN Ros L Dishler, DO        And    oxytocin (PITOCIN) 10 unit bolus from the bag  10 Units IntraVENous PRN Ros L Dishler, DO        terbutaline (BRETHINE) injection 0.25 mg  0.25 mg SubCUTAneous Once Ros L Dishler, DO        butorphanol (STADOL) injection 1 mg  1 mg IntraVENous Q3H PRN Ros L Dishler, DO        ondansetron (ZOFRAN) injection 4 mg  4 mg IntraVENous Q6H PRN Ros POLY Anne DO        penicillin G potassium 2.5 million units in 0.9% sodium chloride 100 mL IVPB  2.5 Million Units IntraVENous Q4H Ros Anne DO   2.5 Million Units at 08/31/22 1100    oxytocin (PITOCIN) 30 units in 500 mL infusion  1-20 jorge-units/min IntraVENous Continuous Ros Anne DO         Facility-Administered Medications Ordered in Other Encounters   Medication Dose Route Frequency Provider Last Rate Last Admin    ropivacaine (NAROPIN) 0.2% injection 0.2%   Epidural Continuous PRN MARYLU Lopez - CRNA 10 mL/hr at 08/31/22 1244 10 mL/hr at 08/31/22 1244    ropivacaine (NAROPIN) 0.5% injection   Epidural PRN Jorje Miguel MD   10 mL at 08/31/22 1239       Allergies:  No Known Allergies    Problem List:    Patient Active Problem List   Diagnosis Code    High risk pregnancy, multigravida of advanced maternal age in third trimester O36.200    Grand multipara Z64.1    GBS bacteriuria R82.71    History of inadequate prenatal care O09.30    H/O varicose veins Z86.79    Noncompliance Z91.19    39 weeks gestation of pregnancy Z3A.39       Past Medical History:        Diagnosis Date    H/O varicose veins        Past Surgical History:        Procedure Laterality Date    APPENDECTOMY      BREAST LUMPECTOMY Right     WISDOM TOOTH EXTRACTION         Social History:    Social History     Tobacco Use    Smoking status: Never    Smokeless tobacco: Never   Substance Use Topics    Alcohol use: Never                                Counseling given: Not Answered      Vital Signs (Current):   Vitals:    08/31/22 1238 08/31/22 1242 08/31/22 1243 08/31/22 1244   BP: (!) 143/95 130/82 107/66 108/65   Pulse: 71 63 66 64   Resp:       Temp:       TempSrc:       SpO2:                                                  BP Readings from Last 3 Encounters:   08/31/22 108/65   08/28/22 100/60   08/16/22 104/64       NPO Status: BMI:   Wt Readings from Last 3 Encounters:   08/28/22 140 lb (63.5 kg)   08/16/22 138 lb (62.6 kg)   08/11/22 136 lb (61.7 kg)     There is no height or weight on file to calculate BMI.    CBC:   Lab Results   Component Value Date/Time    WBC 10.3 08/31/2022 07:28 AM    RBC 4.39 08/31/2022 07:28 AM    HGB 11.8 08/31/2022 07:28 AM    HCT 36.2 08/31/2022 07:28 AM    MCV 82.5 08/31/2022 07:28 AM    RDW 13.2 08/31/2022 07:28 AM     08/31/2022 07:28 AM       CMP: No results found for: NA, K, CL, CO2, BUN, CREATININE, GFRAA, AGRATIO, LABGLOM, GLUCOSE, GLU, PROT, CALCIUM, BILITOT, ALKPHOS, AST, ALT    POC Tests: No results for input(s): POCGLU, POCNA, POCK, POCCL, POCBUN, POCHEMO, POCHCT in the last 72 hours. Coags: No results found for: PROTIME, INR, APTT    HCG (If Applicable): No results found for: PREGTESTUR, PREGSERUM, HCG, HCGQUANT     ABGs: No results found for: PHART, PO2ART, HQX2ZEV, AJX5QBD, BEART, P7HPBZSB     Type & Screen (If Applicable):  No results found for: LABABO, LABRH    Drug/Infectious Status (If Applicable):  No results found for: HIV, HEPCAB    COVID-19 Screening (If Applicable): No results found for: COVID19        Anesthesia Evaluation  Patient summary reviewed and Nursing notes reviewed no history of anesthetic complications:   Airway: Mallampati: II  TM distance: >3 FB   Neck ROM: full     Dental: normal exam         Pulmonary:Negative Pulmonary ROS breath sounds clear to auscultation                             Cardiovascular:  Exercise tolerance: good (>4 METS),           Rhythm: regular  Rate: normal                    Neuro/Psych:   Negative Neuro/Psych ROS              GI/Hepatic/Renal: Neg GI/Hepatic/Renal ROS            Endo/Other:                      ROS comment: G11 Abdominal:             Vascular:           Other Findings:           Anesthesia Plan      epidural     ASA 2             Anesthetic plan and risks discussed with patient and spouse.                         Candelaria Campbell MD   8/31/2022

## 2022-09-01 VITALS
DIASTOLIC BLOOD PRESSURE: 73 MMHG | SYSTOLIC BLOOD PRESSURE: 117 MMHG | HEART RATE: 67 BPM | OXYGEN SATURATION: 100 % | TEMPERATURE: 97.7 F | RESPIRATION RATE: 16 BRPM

## 2022-09-01 RX ORDER — FUROSEMIDE 20 MG/1
20 TABLET ORAL DAILY
Status: DISCONTINUED | OUTPATIENT
Start: 2022-09-01 | End: 2022-09-01 | Stop reason: HOSPADM

## 2022-09-01 RX ORDER — FUROSEMIDE 20 MG/1
20 TABLET ORAL DAILY
Qty: 5 TABLET | Refills: 0 | Status: SHIPPED | OUTPATIENT
Start: 2022-09-01 | End: 2022-09-06

## 2022-09-01 NOTE — CARE COORDINATION
SW consult received: Non-compliance in pregnancy/limited [facility]. Patient's UDS on 22 was negative. Umbilical drug screen pending. SW met with patient to complete initial assessment. Per patient, she and her family recently moved to [place] from [place]. She confirms receiving prenatal care in [place] and then seeing a [facility] in [place]. Patient has not yet selected a pediatrician for , but she plans to use [facility] ([facility] [address]). Patient without a PCP and denied the need for assistance with obtaining a PCP. Patient denies any history of postpartum depression/anxiety. Patient given informational packet on  mood & anxiety disorders (resources/education). Family denies any additional needs from  at this time. Family has 's contact information should any needs/questions arise.     AN Salgado, [address] [facility]   [phone]

## 2022-09-01 NOTE — LACTATION NOTE

## 2022-09-01 NOTE — PROGRESS NOTES
Patient discharged to home per MD orders. Discharge instructions reviewed with patient and pt given a copy. Questions encouraged and answered. Patient verbalizes understanding. Patient escorted by [facility] staff to private vehicle via w/c. Stable at discharge.

## 2022-09-01 NOTE — DISCHARGE INSTRUCTIONS
you are breastfeeding. You may bleed more, and longer at first, than you did before you got pregnant. Wait until you are healed (about 4 to 6 weeks) before you have sex. Ask your doctor when it is okay for you to have sex. Try not to travel with your baby for 5 or 6 weeks. If you take a long car trip, make frequent stops to walk around and stretch. Pelvic rest for 6wk  NO driving for 2wk or while taking pain medications  NO tub baths, pools, or hot tubs for 6wk. Avoid exhaustion  Rest every day. Try to nap when your baby naps. Ask another adult to be with you for a few days after delivery. Plan for  if you have other children. Stay flexible so you can eat at odd hours and sleep when you need to. Both you and your baby are making new schedules. Plan small trips to get out of the house. Change can make you feel less tired. Ask for help with housework, cooking, and shopping. Remind yourself that your job is to care for your baby. Know about help for postpartum depression  \"Baby blues\" are common for the first 1 to 2 weeks after birth. You may cry or feel sad or irritable for no reason. Rest whenever you can. Being tired makes it harder to handle your emotions. Go for walks with your baby. Talk to your partner, friends, and family about your feelings. If your symptoms last for more than a few weeks, or if you feel very depressed, ask your doctor for help. Postpartum depression can be treated. Support groups and counseling can help. Sometimes medicine can also help. Stay healthy  Eat healthy foods so you have more energy. If you breastfeed, avoid drugs. If you quit smoking during pregnancy, try to stay smoke-free. If you choose to have a drink now and then, have only one drink, and limit the number of occasions that you have a drink. Wait to breastfeed at least 2 hours after you have a drink to reduce the amount of alcohol the baby may get in the milk.   Start daily exercise after 4 to 6 weeks, but rest when you feel tired. Learn exercises to tone your belly. Do Kegel exercises to regain strength in your pelvic muscles. You can do these exercises while you stand or sit. Squeeze the same muscles you would use to stop your urine. Your belly and thighs should not move. Hold the squeeze for 3 seconds, and then relax for 3 seconds. Start with 3 seconds. Then add 1 second each week until you are able to squeeze for 10 seconds. Repeat the exercise 10 to 15 times for each session. Do three or more sessions each day. Find a class for you and your baby that has an exercise time. If you had a  birth, give yourself a bit more time before you exercise, and be careful. When should you call for help? Share this information with your partner, family, or a friend. They can help you watch for warning signs. Call 911 anytime you think you may need emergency care. For example, call if:    You have thoughts of harming yourself, your baby, or another person. You passed out (lost consciousness). You have chest pain, are short of breath, or cough up blood. You have a seizure. Call your doctor now or seek immediate medical care if:    You have signs of hemorrhage (too much bleeding), such as:  Heavy vaginal bleeding. This means that you are soaking through one or more pads in an hour. Or you pass blood clots bigger than an egg. Feeling dizzy or lightheaded, or you feel like you may faint. Feeling so tired or weak that you cannot do your usual activities. A fast or irregular heartbeat. New or worse belly pain. You have signs of infection, such as:  A fever. Vaginal discharge that smells bad. New or worse belly pain. You have symptoms of a blood clot in your leg (called a deep vein thrombosis), such as:  Pain in the calf, back of the knee, thigh, or groin. Redness and swelling in your leg or groin.      You have signs of preeclampsia, such as:  Sudden swelling of your face, hands, or feet. New vision problems (such as dimness, blurring, or seeing spots). A severe headache. Watch closely for changes in your health, and be sure to contact your doctor if:    Your vaginal bleeding isn't decreasing. You feel sad, anxious, or hopeless for more than a few days. You are having problems with your breasts or breastfeeding. Where can you learn more? Go to https://[employer] land investmenteweb.healthAgribots. org and sign in to your [employer] account. Enter K413 in the Scroll.in box to learn more about \"After Your Delivery (the Postpartum Period): Care Instructions. \"     If you do not have an account, please click on the \"Sign Up Now\" link. Current as of: February 23, 2022               Content Version: 13.3  © 2006-2022 Healthwise, Incorporated. Care instructions adapted under license by [facility] ([facility]). If you have questions about a medical condition or this instruction, always ask your healthcare professional. [place] [address] any warranty or liability for your use of this information.

## 2022-09-01 NOTE — PROGRESS NOTES
Progress Note                               Patient: Cale Barajas MRN: [id number]  SSN: [id number]    YOB: 1975  Age: 40 y.o. Sex: female      Postpartum Day Number 1    Subjective:     Patient doing well postpartum without significant complaints. Voiding without difficulty. Patient reports normal lochia. Pain well controlled, voiding on her own without difficulty. Denies HA, SOB/CP, RUQ pain, Vision changes. Requesting early DC home today. Declines all pain meds. Objective:     Patient Vitals for the past 12 hrs:   Temp Pulse Resp BP SpO2   22 0724 98.1 °F (36.7 °C) 60 17 (!) 145/90 98 %   22 2326 97.7 °F (36.5 °C) 68 16 107/73 96 %       Temp (24hrs), Av.9 °F (36.6 °C), Min:97.7 °F (36.5 °C), Max:98.1 °F (36.7 °C)        Intake/Output Summary (Last 24 hours) at 2022 1118  Last data filed at 2022 1833  Gross per 24 hour   Intake --   Output 1500 ml   Net -1500 ml         Physical Exam:    Constitutional: She appears well-developed and well-nourished. No distress. HENT:    Head: Normocephalic and atraumatic.    Cardiovascular: RRR  Pulmonary/Chest: CTAB  Abd:  S/NTTP/ND, BS normoactive, fundus firm at umbilicus  Ext:  No c/c/e      Lab/Data Review:  Recent Results (from the past 72 hour(s))   CBC with Auto Differential    Collection Time: 22  7:28 AM   Result Value Ref Range    WBC 10.3 4.3 - 11.1 K/uL    RBC 4.39 4.05 - 5.2 M/uL    Hemoglobin 11.8 11.7 - 15.4 g/dL    Hematocrit 36.2 35.8 - 46.3 %    MCV 82.5 79.6 - 97.8 FL    MCH 26.9 26.1 - 32.9 PG    MCHC 32.6 31.4 - 35.0 g/dL    RDW 13.2 11.9 - 14.6 %    Platelets 817 526 - 993 K/uL    MPV 10.3 9.4 - 12.3 FL    nRBC 0.00 0.0 - 0.2 K/uL    Differential Type AUTOMATED      Seg Neutrophils 67 43 - 78 %    Lymphocytes 23 13 - 44 %    Monocytes 6 4.0 - 12.0 %    Eosinophils % 3 0.5 - 7.8 %    Basophils 1 0.0 - 2.0 %    Immature Granulocytes 1 0.0 - 5.0 %    Segs Absolute 6.9 1.7 - 8.2 K/UL Absolute Lymph # 2.4 0.5 - 4.6 K/UL    Absolute Mono # 0.6 0.1 - 1.3 K/UL    Absolute Eos # 0.3 0.0 - 0.8 K/UL    Basophils Absolute 0.1 0.0 - 0.2 K/UL    Absolute Immature Granulocyte 0.1 0.0 - 0.5 K/UL   TYPE AND SCREEN    Collection Time: 22  7:28 AM   Result Value Ref Range    Crossmatch expiration date 2022,2359     ABO/Rh O POSITIVE     Antibody Screen NEG          Information for the patient's :  [employer] Raf Diana [[id number]]     Lab Results   Component Value Date/Time    ABORH O POSITIVE 2022 02:21 PM           Assessment and Plan:     40 y.o. [id number] ppd# 1 s/p  at 39w0d after IOL for AMA:     1) PP:  Meeting all pp goals, continue routine care; watch bps today closely -- possible early DC home today if bps ok and ok w/ peds    2) Breast  feeding, Rh pos , Rub imm     3) Non-compliance in pregnancy/limited PNC, [place]:  refused any  testing or growth US's during pregnancy despite high risk/counseling. UDS neg. SW consult    4) labile bps:  mostly low normotensive bps but will have periodic mild range bps. Pt at much increased risk of PP PreE given age. Lasix 20mg x 5 days recommended for ppx.          Signed By: Jennifer Gonzalez MD     2022

## 2022-09-01 NOTE — PROGRESS NOTES
Shift assessment complete see flowsheet. Discussed today plan of care with pt. Pt declines scheduled medications stating \"I am not going to take something for 1 minute of cramping\". No s/s of distress noted. Questions encouraged and answered. Pt in bed  with call light in reach. Adequate: hears normal conversation without difficulty Mildly to Moderately Impaired: difficulty hearing in some environments or speaker may need to increase volume or speak distinctly

## 2022-09-01 NOTE — PROGRESS NOTES
Dr. Maricarmen Abdullahi notified via phone of pt current /73, per MD pt to still have the lasix, orders read back and verified.

## 2022-09-01 NOTE — DISCHARGE SUMMARY
Discharge Summary:     Date of Admission:  2022  6:41 AM  Date of Discharge:  2022      Patient is a 40 y.o. [id number] at 39w0d wks who was admitted for IOL due to [facility]. She had a  and a normal PP course. Patient remained afebrile throughout the entire hospital stay. On day of discharge, she was discharged home in good condition, meeting all postop goals with routine postpartum instructions. She did have a few isolated, rare mild bps. Rx Lasix given and pp PreE precautions given. Pt to follow up in 6  weeks for pp visit at [facility]. HTN diagnosis: see above     Discharge Meds:       Medication List        START taking these medications      furosemide 20 MG tablet  Commonly known as: LASIX  Take 1 tablet by mouth daily for 5 doses            CONTINUE taking these medications      PRENATAL VITAMIN PO               Where to Get Your Medications        These medications were sent to [facility]/pharmacy [id number] [address], [facility] [address]      Phone: [phone]   furosemide 20 MG tablet         No problem-specific Assessment & Plan notes found for this encounter.        Sheyla John MD  11:22 AM  22